# Patient Record
Sex: FEMALE | Race: WHITE | NOT HISPANIC OR LATINO
[De-identification: names, ages, dates, MRNs, and addresses within clinical notes are randomized per-mention and may not be internally consistent; named-entity substitution may affect disease eponyms.]

---

## 2017-10-03 ENCOUNTER — APPOINTMENT (OUTPATIENT)
Dept: UROGYNECOLOGY | Facility: CLINIC | Age: 67
End: 2017-10-03
Payer: MEDICARE

## 2017-10-03 VITALS — HEIGHT: 63 IN | DIASTOLIC BLOOD PRESSURE: 76 MMHG | SYSTOLIC BLOOD PRESSURE: 130 MMHG

## 2017-10-03 DIAGNOSIS — N39.3 STRESS INCONTINENCE (FEMALE) (MALE): ICD-10-CM

## 2017-10-03 DIAGNOSIS — N95.2 POSTMENOPAUSAL ATROPHIC VAGINITIS: ICD-10-CM

## 2017-10-03 LAB
BILIRUB UR QL STRIP: NORMAL
CLARITY UR: CLEAR
COLLECTION METHOD: NORMAL
GLUCOSE UR-MCNC: NORMAL
HCG UR QL: 0.2 EU/DL
HGB UR QL STRIP.AUTO: NORMAL
KETONES UR-MCNC: NORMAL
LEUKOCYTE ESTERASE UR QL STRIP: NORMAL
NITRITE UR QL STRIP: NORMAL
PH UR STRIP: 7
PROT UR STRIP-MCNC: NORMAL
SP GR UR STRIP: 1.02

## 2017-10-03 PROCEDURE — 99204 OFFICE O/P NEW MOD 45 MIN: CPT | Mod: 25

## 2017-10-03 PROCEDURE — 51701 INSERT BLADDER CATHETER: CPT

## 2017-10-04 ENCOUNTER — RESULT REVIEW (OUTPATIENT)
Age: 67
End: 2017-10-04

## 2017-10-05 ENCOUNTER — RESULT REVIEW (OUTPATIENT)
Age: 67
End: 2017-10-05

## 2017-10-05 LAB — BACTERIA UR CULT: NORMAL

## 2017-10-06 ENCOUNTER — MOBILE ON CALL (OUTPATIENT)
Age: 67
End: 2017-10-06

## 2017-10-10 ENCOUNTER — RESULT REVIEW (OUTPATIENT)
Age: 67
End: 2017-10-10

## 2017-10-13 ENCOUNTER — RESULT REVIEW (OUTPATIENT)
Age: 67
End: 2017-10-13

## 2017-10-18 ENCOUNTER — RESULT REVIEW (OUTPATIENT)
Age: 67
End: 2017-10-18

## 2017-11-02 ENCOUNTER — APPOINTMENT (OUTPATIENT)
Dept: OBGYN | Facility: CLINIC | Age: 67
End: 2017-11-02

## 2017-11-06 ENCOUNTER — APPOINTMENT (OUTPATIENT)
Dept: UROGYNECOLOGY | Facility: CLINIC | Age: 67
End: 2017-11-06
Payer: MEDICARE

## 2017-11-06 ENCOUNTER — OUTPATIENT (OUTPATIENT)
Dept: OUTPATIENT SERVICES | Facility: HOSPITAL | Age: 67
LOS: 1 days | End: 2017-11-06
Payer: MEDICARE

## 2017-11-06 VITALS
HEIGHT: 63 IN | SYSTOLIC BLOOD PRESSURE: 128 MMHG | DIASTOLIC BLOOD PRESSURE: 80 MMHG | HEART RATE: 91 BPM | BODY MASS INDEX: 48.9 KG/M2 | WEIGHT: 276 LBS | TEMPERATURE: 98.1 F

## 2017-11-06 DIAGNOSIS — N39.0 URINARY TRACT INFECTION, SITE NOT SPECIFIED: ICD-10-CM

## 2017-11-06 DIAGNOSIS — N81.6 RECTOCELE: ICD-10-CM

## 2017-11-06 DIAGNOSIS — R31.29 OTHER MICROSCOPIC HEMATURIA: ICD-10-CM

## 2017-11-06 LAB
BILIRUB UR QL STRIP: NORMAL
CLARITY UR: CLEAR
COLLECTION METHOD: NORMAL
GLUCOSE UR-MCNC: NORMAL
HCG UR QL: 0.2 EU/DL
HGB UR QL STRIP.AUTO: NORMAL
KETONES UR-MCNC: NORMAL
LEUKOCYTE ESTERASE UR QL STRIP: NORMAL
NITRITE UR QL STRIP: NORMAL
PH UR STRIP: 5.5
PROT UR STRIP-MCNC: NORMAL
SP GR UR STRIP: 1.01

## 2017-11-06 PROCEDURE — 52000 CYSTOURETHROSCOPY: CPT

## 2017-11-06 PROCEDURE — 99213 OFFICE O/P EST LOW 20 MIN: CPT | Mod: 25

## 2017-11-07 DIAGNOSIS — R31.29 OTHER MICROSCOPIC HEMATURIA: ICD-10-CM

## 2017-11-13 ENCOUNTER — RESULT REVIEW (OUTPATIENT)
Age: 67
End: 2017-11-13

## 2017-11-13 LAB
APPEARANCE: CLEAR
BACTERIA: NEGATIVE
BILIRUBIN URINE: NEGATIVE
BLOOD URINE: NEGATIVE
COLOR: YELLOW
GLUCOSE QUALITATIVE U: NEGATIVE MG/DL
HYALINE CASTS: 1 /LPF
KETONES URINE: NEGATIVE
LEUKOCYTE ESTERASE URINE: NEGATIVE
MICROSCOPIC-UA: NORMAL
NITRITE URINE: NEGATIVE
PH URINE: 8
PROTEIN URINE: NEGATIVE MG/DL
RED BLOOD CELLS URINE: 4 /HPF
SPECIFIC GRAVITY URINE: 1.02
SQUAMOUS EPITHELIAL CELLS: 2 /HPF
UROBILINOGEN URINE: NEGATIVE MG/DL
WHITE BLOOD CELLS URINE: 0 /HPF

## 2017-11-27 ENCOUNTER — MESSAGE (OUTPATIENT)
Age: 67
End: 2017-11-27

## 2018-07-23 PROBLEM — N81.6 RECTOCELE: Status: ACTIVE | Noted: 2017-10-03

## 2019-02-05 ENCOUNTER — APPOINTMENT (OUTPATIENT)
Dept: ENDOCRINOLOGY | Facility: CLINIC | Age: 69
End: 2019-02-05
Payer: MEDICARE

## 2019-02-05 VITALS
RESPIRATION RATE: 12 BRPM | HEART RATE: 79 BPM | WEIGHT: 261 LBS | OXYGEN SATURATION: 97 % | BODY MASS INDEX: 46.25 KG/M2 | DIASTOLIC BLOOD PRESSURE: 80 MMHG | HEIGHT: 63 IN | SYSTOLIC BLOOD PRESSURE: 132 MMHG

## 2019-02-05 DIAGNOSIS — R76.8 OTHER SPECIFIED ABNORMAL IMMUNOLOGICAL FINDINGS IN SERUM: ICD-10-CM

## 2019-02-05 DIAGNOSIS — E04.2 NONTOXIC MULTINODULAR GOITER: ICD-10-CM

## 2019-02-05 PROCEDURE — 99205 OFFICE O/P NEW HI 60 MIN: CPT

## 2019-02-12 LAB
T4 FREE SERPL-MCNC: 1.2 NG/DL
THYROGLOB AB SERPL-ACNC: <20 IU/ML
THYROPEROXIDASE AB SERPL IA-ACNC: 21.8 IU/ML
TSH SERPL-ACNC: 1.09 UIU/ML

## 2022-04-19 ENCOUNTER — APPOINTMENT (OUTPATIENT)
Dept: SURGICAL ONCOLOGY | Facility: CLINIC | Age: 72
End: 2022-04-19
Payer: MEDICARE

## 2022-04-19 VITALS
HEIGHT: 63 IN | WEIGHT: 256 LBS | SYSTOLIC BLOOD PRESSURE: 140 MMHG | DIASTOLIC BLOOD PRESSURE: 81 MMHG | HEART RATE: 96 BPM | BODY MASS INDEX: 45.36 KG/M2

## 2022-04-19 VITALS — TEMPERATURE: 97.1 F

## 2022-04-19 PROCEDURE — 99205 OFFICE O/P NEW HI 60 MIN: CPT

## 2022-04-19 NOTE — HISTORY OF PRESENT ILLNESS
[de-identified] : Ms. Urena is a 73 y/o female presenting today for an initial consultation for a right breast mass on screening sonogram, referred by Dr. Cherelle Gupta. \par \par Most recent [facility] (3/25/2022): Bilateral prominent axillary lymph nodes.  Non-cystic lesions of the right breast noted at 12:00 and 9:00 on  the breast sonogram sono-guided biopsy of the lesion is recommended. BIRADS-4. \par \par Patient has multiple family members with breast cancer. ( grandmother, mother, and sister )\par \par Her past medical history includes thrombocytopenia, endometrial cancer (s/p hysterectomy), HTN, HLD, lung nodules and thyroid nodules.  \par \par Today she denies palpable breast masses, nipple discharge, skin changes, inversion or breast pain. Denies constitutional symptoms. No health changes.

## 2022-04-19 NOTE — ASSESSMENT
[FreeTextEntry1] : IMP:\par Breast imaging revealed non-cystic lesions of the right breast noted in the breast sonogram sono-guided biopsy of the lesion is recommended BIRADS-4. Prominent bilateral axillary lymph nodes are also noted.\par \par \par PLAN:\par Ultrasound-guided right breast biopsy of the 12:00 lesion\par The 9:00 lesion and the lymph nodes do NOT need biopsy at this point\par BRCA testing

## 2022-04-19 NOTE — PHYSICAL EXAM
[Normal] : supple, no neck mass and thyroid not enlarged [Normal Supraclavicular Lymph Nodes] : normal supraclavicular lymph nodes [Normal Axillary Lymph Nodes] : normal axillary lymph nodes [Normal] : oriented to person, place and time, with appropriate affect [de-identified] : Normal S1, S2.  Regular rate and rhythm. [de-identified] : Complete breast exam performed in supine and upright positions.  No palpable masses, tenderness, nipple discharge, inversion, deviation or enlarged axillary or supraclavicular lymph nodes bilaterally. [de-identified] : Clear breath sounds bilaterally, normal respiratory effort.

## 2022-04-19 NOTE — CONSULT LETTER
[Dear  ___] : Dear  [unfilled], [Consult Letter:] : I had the pleasure of evaluating your patient, [unfilled]. [Please see my note below.] : Please see my note below. [Consult Closing:] : Thank you very much for allowing me to participate in the care of this patient.  If you have any questions, please do not hesitate to contact me. [Sincerely,] : Sincerely, [FreeTextEntry1] : I will keep you informed of the pathology of the biopsy. [FreeTextEntry3] : Jesus Larios MD FACS\par Chief of Surgical Oncology\par \par  negative...

## 2022-04-25 ENCOUNTER — APPOINTMENT (OUTPATIENT)
Dept: SURGICAL ONCOLOGY | Facility: CLINIC | Age: 72
End: 2022-04-25
Payer: MEDICARE

## 2022-04-25 VITALS
HEIGHT: 63 IN | OXYGEN SATURATION: 95 % | RESPIRATION RATE: 16 BRPM | DIASTOLIC BLOOD PRESSURE: 60 MMHG | HEART RATE: 85 BPM | TEMPERATURE: 97.7 F | SYSTOLIC BLOOD PRESSURE: 124 MMHG

## 2022-04-25 DIAGNOSIS — R92.8 OTHER ABNORMAL AND INCONCLUSIVE FINDINGS ON DIAGNOSTIC IMAGING OF BREAST: ICD-10-CM

## 2022-04-25 PROCEDURE — 19083 BX BREAST 1ST LESION US IMAG: CPT

## 2022-04-25 NOTE — ASSESSMENT
[FreeTextEntry1] : Right breast sono-guided core biopsy performed and clip deplyed\par \par Plan:\par check pathology\par RTO 6 months with right breast sonogram

## 2022-04-25 NOTE — PROCEDURE
[Core Needle Biopsy] : core needle biopsy ~T ~C was performed  [Area of Mass: ______] : mass identified in the [unfilled] [Patient] : the patient [Risks] : risks [Consent Obtained] : written consent was obtained prior to the procedure and is detailed in the patient's record [___ ml Inj] : [unfilled] ~Uml [1%] : 1% [With Epi] : with epinephrine [Supine] : the patient was placed in the supine position with the neck extended as tolerated [Betadine] : with betadine solution [ATEC 9 Gauge Needle] : ATEC 9 gauge needle was used [Clip Placement ___] : Clip placement [unfilled] [1 Pass] : 1 pass was made through the mass [Ultrasonic Guidance] : ultrasound guidance was employed [Sent to Histology] : the specimens were prepared in the usual manner and sent to cytopathologist [Tolerated Well] : the patient tolerated the procedure well [No Complications] : there were no complications [___ Month(s)] : in [unfilled] month(s)

## 2022-04-27 ENCOUNTER — NON-APPOINTMENT (OUTPATIENT)
Age: 72
End: 2022-04-27

## 2022-04-27 LAB — CORE LAB BIOPSY: NORMAL

## 2023-07-12 ENCOUNTER — APPOINTMENT (OUTPATIENT)
Dept: PULMONOLOGY | Facility: CLINIC | Age: 73
End: 2023-07-12
Payer: MEDICARE

## 2023-07-12 VITALS
SYSTOLIC BLOOD PRESSURE: 169 MMHG | WEIGHT: 202 LBS | DIASTOLIC BLOOD PRESSURE: 93 MMHG | TEMPERATURE: 98.1 F | HEIGHT: 62 IN | BODY MASS INDEX: 37.17 KG/M2 | HEART RATE: 74 BPM | OXYGEN SATURATION: 95 %

## 2023-07-12 VITALS — SYSTOLIC BLOOD PRESSURE: 160 MMHG | DIASTOLIC BLOOD PRESSURE: 98 MMHG

## 2023-07-12 DIAGNOSIS — U09.9 POST COVID-19 CONDITION, UNSPECIFIED: ICD-10-CM

## 2023-07-12 DIAGNOSIS — J98.4 OTHER DISORDERS OF LUNG: ICD-10-CM

## 2023-07-12 DIAGNOSIS — I10 ESSENTIAL (PRIMARY) HYPERTENSION: ICD-10-CM

## 2023-07-12 DIAGNOSIS — R91.8 OTHER NONSPECIFIC ABNORMAL FINDING OF LUNG FIELD: ICD-10-CM

## 2023-07-12 DIAGNOSIS — R63.4 ABNORMAL WEIGHT LOSS: ICD-10-CM

## 2023-07-12 PROCEDURE — 99205 OFFICE O/P NEW HI 60 MIN: CPT | Mod: 25

## 2023-07-12 PROCEDURE — 94010 BREATHING CAPACITY TEST: CPT

## 2023-07-12 RX ORDER — ESTRADIOL 10 UG/1
10 TABLET, FILM COATED VAGINAL
Qty: 24 | Refills: 3 | Status: DISCONTINUED | COMMUNITY
Start: 2017-10-03 | End: 2023-07-12

## 2023-07-12 NOTE — REVIEW OF SYSTEMS
[Recent Wt Loss (___ Lbs)] : ~T recent [unfilled] lb weight loss [Memory Loss] : memory loss [Depression] : depression [Negative] : Endocrine

## 2023-07-13 PROBLEM — J98.4 RESTRICTIVE LUNG DISEASE: Status: ACTIVE | Noted: 2023-07-13

## 2023-07-13 PROBLEM — R63.4 WEIGHT LOSS: Status: ACTIVE | Noted: 2023-07-13

## 2023-07-13 NOTE — PHYSICAL EXAM
[No Acute Distress] : no acute distress [Normal Oropharynx] : normal oropharynx [Normal Appearance] : normal appearance [No Neck Mass] : no neck mass [Normal Rate/Rhythm] : normal rate/rhythm [Normal S1, S2] : normal s1, s2 [No Murmurs] : no murmurs [No Resp Distress] : no resp distress [Clear to Auscultation Bilaterally] : clear to auscultation bilaterally [No Abnormalities] : no abnormalities [Benign] : benign [Normal Gait] : normal gait [No Clubbing] : no clubbing [No Cyanosis] : no cyanosis [No Edema] : no edema [Normal Color/ Pigmentation] : normal color/ pigmentation [No Focal Deficits] : no focal deficits [Oriented x3] : oriented x3 [Normal Affect] : normal affect [Normal to Percussion] : normal to percussion [No HSM] : no hsm

## 2023-07-13 NOTE — PROCEDURE
[FreeTextEntry1] : 07/12/2023\par Pulmonary function testing\par There is a mild ventilatory impairment in a restrictive pattern. \par \par \par CAT scan of the chest of June 6, 2023 describes pulmonary nodules largest 9 mm in size.  Reportedly without significant change compared to prior CT of April 6, 2023.\par Calcified granulomas.\par Areas of scarring.\par Few tree-in-bud opacities\par \par

## 2023-07-13 NOTE — DISCUSSION/SUMMARY
[FreeTextEntry1] : Pulmonary Nodules.\par Tree-in-bud opacities likely related to mucoid impaction.  Cannot exclude low-grade chronic infectious etiology such as atypical mycobacterial disease however findings are very mild.\par Weight loss.  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

## 2023-07-13 NOTE — ASSESSMENT
[FreeTextEntry1] : Obtain CT from [facility] and bring to [facility] for comparison.\par Follow-up post comparison.\par We will obtain formal lung function testing in the future.\par Follow weight.

## 2023-07-13 NOTE — HISTORY OF PRESENT ILLNESS
[Former] : former [TextBox_4] : HARI MANTILLA is a 73 year old  F referred for pulmonary evaluation for abnormal CAT scan.\par \par Referred for abnormal CT. Done for wt. loss. Had CT C/A/P\par Positive wt. loss. 64 pounds in 8 months. \par Had COVID which affected memory and has had loss of appetite.\par ? depressed and has a lot of anxiety. \par No significant cough, wheezing, chest pain or SOB.\par \par \par Past pulmonary history. Pulmonary nodules.  \par Occupational Exposure. N\par Family history of pulmonary disease. N\par Recent travel N\par Pets Dog not allergic. \par \par Hx of endometrial cancer 2002. Treated with surgery. No chemotherapy. \par \par Lived t/o life in [place] [TextBox_29] : Smoked as teenager.

## 2023-07-13 NOTE — CONSULT LETTER
[Dear  ___] : Dear ~ELVA, [Consult Letter:] : I had the pleasure of evaluating your patient, [unfilled]. [Consult Closing:] : Thank you very much for allowing me to participate in the care of this patient.  If you have any questions, please do not hesitate to contact me. [Sincerely,] : Sincerely, [FreeTextEntry2] : Cherelle Gupta DO\par  [FreeTextEntry3] : Alexis Fofana MD FCCP\par

## 2023-10-08 ENCOUNTER — NON-APPOINTMENT (OUTPATIENT)
Age: 73
End: 2023-10-08

## 2023-10-23 ENCOUNTER — APPOINTMENT (OUTPATIENT)
Dept: OBGYN | Facility: CLINIC | Age: 73
End: 2023-10-23

## 2024-01-31 ENCOUNTER — NON-APPOINTMENT (OUTPATIENT)
Age: 74
End: 2024-01-31

## 2024-02-16 ENCOUNTER — APPOINTMENT (OUTPATIENT)
Dept: OTOLARYNGOLOGY | Facility: CLINIC | Age: 74
End: 2024-02-16
Payer: MEDICARE

## 2024-02-16 VITALS
HEART RATE: 66 BPM | HEIGHT: 62.5 IN | BODY MASS INDEX: 35.88 KG/M2 | WEIGHT: 200 LBS | RESPIRATION RATE: 17 BRPM | DIASTOLIC BLOOD PRESSURE: 83 MMHG | OXYGEN SATURATION: 97 % | SYSTOLIC BLOOD PRESSURE: 149 MMHG

## 2024-02-16 DIAGNOSIS — H93.293 OTHER ABNORMAL AUDITORY PERCEPTIONS, BILATERAL: ICD-10-CM

## 2024-02-16 DIAGNOSIS — Z85.42 PERSONAL HISTORY OF MALIGNANT NEOPLASM OF OTHER PARTS OF UTERUS: ICD-10-CM

## 2024-02-16 DIAGNOSIS — H61.23 IMPACTED CERUMEN, BILATERAL: ICD-10-CM

## 2024-02-16 DIAGNOSIS — H90.3 SENSORINEURAL HEARING LOSS, BILATERAL: ICD-10-CM

## 2024-02-16 PROCEDURE — 99203 OFFICE O/P NEW LOW 30 MIN: CPT | Mod: 25

## 2024-02-16 PROCEDURE — 92557 COMPREHENSIVE HEARING TEST: CPT

## 2024-02-16 PROCEDURE — 92567 TYMPANOMETRY: CPT

## 2024-02-16 PROCEDURE — G0268 REMOVAL OF IMPACTED WAX MD: CPT

## 2024-02-16 RX ORDER — OLMESARTAN MEDOXOMIL 40 MG/1
TABLET, FILM COATED ORAL
Refills: 0 | Status: COMPLETED | COMMUNITY
End: 2024-02-16

## 2024-02-16 NOTE — HISTORY OF PRESENT ILLNESS
[de-identified] : 74 year old female presents with ear evaluation and hearing loss  History of Uterine cancer, no hx of chemo or radiation and positional vertigo  Reports hx of child phillips ear infections and as an adult. Last ear infection a year ago.  Reports "crackling sounds" to ears since she was a teenager, was exposed to loud noises as a teen. Reports intermittent right tinnitus worse at night or when she is stressed it appears louder.  Denies otalgia, otorrhea, recent fevers and headaches related to hearing  Last audio 2 years ago with [facility]

## 2024-02-16 NOTE — ASSESSMENT
[FreeTextEntry1] : 74 year F present with bilateral SNHL and bilateral cerumen impaction. Patient tolerated cerumen removal without complaints.   History children recurrent ear infections. 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   Recommend SNHL -Discussed Benefit of Hearings Aids and their value of helping keep brain stimulated by helping hear conversation which keeps a person active and socially connected. Stressed also the association with a lower risk of incident dementia and slower cognitive decline. -Clearance Hearing Aid Evaluation Given   Cerumen Impaction -Discussed not using q-tips or instruments to remove wax -Olive or mineral oil 1x per month to keep ear canal lubricated. Discussed that the ear is a self cleaning structure and just allow it clean itself. If wax builds up can try debrox. Once it gets impacted recommend return to get it cleaned out.  -Return to clinic or sooner if new/worsen symptoms present

## 2024-02-23 PROBLEM — H93.293 OTHER ABNORMAL AUDITORY PERCEPTIONS, BILATERAL: Status: ACTIVE | Noted: 2024-02-23

## 2024-02-23 PROBLEM — H61.23 BILATERAL IMPACTED CERUMEN: Status: ACTIVE | Noted: 2024-02-23

## 2024-02-23 PROBLEM — Z85.42 HISTORY OF MALIGNANT NEOPLASM OF UTERUS: Status: RESOLVED | Noted: 2024-02-16 | Resolved: 2024-02-23

## 2024-06-21 ENCOUNTER — APPOINTMENT (OUTPATIENT)
Dept: OTOLARYNGOLOGY | Facility: CLINIC | Age: 74
End: 2024-06-21

## 2025-01-03 ENCOUNTER — NON-APPOINTMENT (OUTPATIENT)
Age: 75
End: 2025-01-03

## 2025-03-04 ENCOUNTER — NON-APPOINTMENT (OUTPATIENT)
Age: 75
End: 2025-03-04